# Patient Record
Sex: FEMALE | Race: BLACK OR AFRICAN AMERICAN | URBAN - METROPOLITAN AREA
[De-identification: names, ages, dates, MRNs, and addresses within clinical notes are randomized per-mention and may not be internally consistent; named-entity substitution may affect disease eponyms.]

---

## 2021-03-09 ENCOUNTER — OFFICE VISIT (OUTPATIENT)
Dept: URBAN - METROPOLITAN AREA CLINIC 12 | Facility: CLINIC | Age: 81
End: 2021-03-09

## 2021-03-30 ENCOUNTER — DASHBOARD ENCOUNTERS (OUTPATIENT)
Age: 81
End: 2021-03-30

## 2021-03-30 ENCOUNTER — WEB ENCOUNTER (OUTPATIENT)
Dept: URBAN - METROPOLITAN AREA CLINIC 115 | Facility: CLINIC | Age: 81
End: 2021-03-30

## 2021-03-30 ENCOUNTER — OFFICE VISIT (OUTPATIENT)
Dept: URBAN - METROPOLITAN AREA CLINIC 115 | Facility: CLINIC | Age: 81
End: 2021-03-30
Payer: MEDICARE

## 2021-03-30 ENCOUNTER — LAB OUTSIDE AN ENCOUNTER (OUTPATIENT)
Dept: URBAN - METROPOLITAN AREA CLINIC 115 | Facility: CLINIC | Age: 81
End: 2021-03-30

## 2021-03-30 VITALS
SYSTOLIC BLOOD PRESSURE: 177 MMHG | BODY MASS INDEX: 22.45 KG/M2 | TEMPERATURE: 96.5 F | WEIGHT: 122 LBS | HEART RATE: 88 BPM | RESPIRATION RATE: 14 BRPM | DIASTOLIC BLOOD PRESSURE: 80 MMHG | HEIGHT: 62 IN

## 2021-03-30 DIAGNOSIS — K74.60 UNSPECIFIED CIRRHOSIS OF LIVER: ICD-10-CM

## 2021-03-30 DIAGNOSIS — R18.8 OTHER ASCITES: ICD-10-CM

## 2021-03-30 PROBLEM — 19943007: Status: ACTIVE | Noted: 2021-03-30

## 2021-03-30 PROBLEM — 59621000: Status: ACTIVE | Noted: 2021-03-30

## 2021-03-30 PROBLEM — 389026000: Status: ACTIVE | Noted: 2021-03-30

## 2021-03-30 PROCEDURE — 99204 OFFICE O/P NEW MOD 45 MIN: CPT | Performed by: INTERNAL MEDICINE

## 2021-03-30 NOTE — PHYSICAL EXAM GASTROINTESTINAL
normal bowel sounds , no masses palpable , no guarding or rigidity , soft, nontender, nondistended,some fluid waves but no significant

## 2021-03-30 NOTE — HPI-TODAY'S VISIT:
81 y/o AF  with cirrhosis unknown etiology c/b some ascites with stable synthetic function who move recently to [place] with her daughter. Last labs work-up with 2.5 bili, INR 1.0 No encephalopathy recenly, jaundice or GI bleeding, last EGD with small varices, last imaging last year without focal lesions. She is oriented in person,place and time today. No recent hospital admisions.Denies alcohol intake. Blood transfusions in the past.

## 2021-04-15 ENCOUNTER — LAB OUTSIDE AN ENCOUNTER (OUTPATIENT)
Dept: URBAN - METROPOLITAN AREA CLINIC 82 | Facility: CLINIC | Age: 81
End: 2021-04-15

## 2021-04-19 ENCOUNTER — TELEPHONE ENCOUNTER (OUTPATIENT)
Dept: URBAN - METROPOLITAN AREA CLINIC 115 | Facility: CLINIC | Age: 81
End: 2021-04-19

## 2021-04-30 ENCOUNTER — OFFICE VISIT (OUTPATIENT)
Dept: URBAN - METROPOLITAN AREA CLINIC 12 | Facility: CLINIC | Age: 81
End: 2021-04-30